# Patient Record
Sex: MALE | Race: WHITE | NOT HISPANIC OR LATINO | Employment: FULL TIME | URBAN - NONMETROPOLITAN AREA
[De-identification: names, ages, dates, MRNs, and addresses within clinical notes are randomized per-mention and may not be internally consistent; named-entity substitution may affect disease eponyms.]

---

## 2018-02-22 ENCOUNTER — OFFICE VISIT (OUTPATIENT)
Dept: INTERNAL MEDICINE | Facility: CLINIC | Age: 38
End: 2018-02-22

## 2018-02-22 VITALS
OXYGEN SATURATION: 99 % | WEIGHT: 228 LBS | SYSTOLIC BLOOD PRESSURE: 122 MMHG | BODY MASS INDEX: 30.22 KG/M2 | TEMPERATURE: 98.4 F | DIASTOLIC BLOOD PRESSURE: 70 MMHG | HEIGHT: 73 IN | HEART RATE: 82 BPM

## 2018-02-22 DIAGNOSIS — Z23 NEED FOR INFLUENZA VACCINATION: ICD-10-CM

## 2018-02-22 DIAGNOSIS — R53.81 MALAISE AND FATIGUE: ICD-10-CM

## 2018-02-22 DIAGNOSIS — L57.0 ACTINIC KERATOSES: ICD-10-CM

## 2018-02-22 DIAGNOSIS — Z00.00 PREVENTATIVE HEALTH CARE: ICD-10-CM

## 2018-02-22 DIAGNOSIS — G43.109 MIGRAINE WITH AURA AND WITHOUT STATUS MIGRAINOSUS, NOT INTRACTABLE: ICD-10-CM

## 2018-02-22 DIAGNOSIS — G47.9 SLEEP DISTURBANCES: ICD-10-CM

## 2018-02-22 DIAGNOSIS — Z76.89 ENCOUNTER TO ESTABLISH CARE: Primary | ICD-10-CM

## 2018-02-22 DIAGNOSIS — R53.83 MALAISE AND FATIGUE: ICD-10-CM

## 2018-02-22 DIAGNOSIS — F41.8 DEPRESSION WITH ANXIETY: ICD-10-CM

## 2018-02-22 LAB
25(OH)D3+25(OH)D2 SERPL-MCNC: 22.4 NG/ML
ALBUMIN SERPL-MCNC: 4.7 G/DL (ref 3.5–5)
ALBUMIN/GLOB SERPL: 1.7 G/DL (ref 1–2)
ALP SERPL-CCNC: 61 U/L (ref 38–126)
ALT SERPL-CCNC: 52 U/L (ref 13–69)
AST SERPL-CCNC: 29 U/L (ref 15–46)
BILIRUB SERPL-MCNC: 0.7 MG/DL (ref 0.2–1.3)
BUN SERPL-MCNC: 11 MG/DL (ref 7–20)
BUN/CREAT SERPL: 13.8 (ref 6.3–21.9)
CALCIUM SERPL-MCNC: 9.8 MG/DL (ref 8.4–10.2)
CHLORIDE SERPL-SCNC: 102 MMOL/L (ref 98–107)
CHOLEST SERPL-MCNC: 164 MG/DL (ref 0–199)
CO2 SERPL-SCNC: 27 MMOL/L (ref 26–30)
CREAT SERPL-MCNC: 0.8 MG/DL (ref 0.6–1.3)
ERYTHROCYTE [DISTWIDTH] IN BLOOD BY AUTOMATED COUNT: 12 % (ref 11.5–14.5)
GFR SERPLBLD CREATININE-BSD FMLA CKD-EPI: 109 ML/MIN/1.73
GFR SERPLBLD CREATININE-BSD FMLA CKD-EPI: 132 ML/MIN/1.73
GLOBULIN SER CALC-MCNC: 2.7 GM/DL
GLUCOSE SERPL-MCNC: 76 MG/DL (ref 74–98)
HCT VFR BLD AUTO: 46.1 % (ref 42–52)
HDLC SERPL-MCNC: 48 MG/DL (ref 40–60)
HGB BLD-MCNC: 16.1 G/DL (ref 14–18)
LDLC SERPL CALC-MCNC: 98 MG/DL (ref 0–99)
MCH RBC QN AUTO: 31 PG (ref 27–31)
MCHC RBC AUTO-ENTMCNC: 34.9 G/DL (ref 30–37)
MCV RBC AUTO: 88.8 FL (ref 80–94)
PLATELET # BLD AUTO: 275 10*3/MM3 (ref 130–400)
POTASSIUM SERPL-SCNC: 4.3 MMOL/L (ref 3.5–5.1)
PROT SERPL-MCNC: 7.4 G/DL (ref 6.3–8.2)
RBC # BLD AUTO: 5.19 10*6/MM3 (ref 4.7–6.1)
SODIUM SERPL-SCNC: 146 MMOL/L (ref 137–145)
TRIGL SERPL-MCNC: 92 MG/DL
TSH SERPL DL<=0.005 MIU/L-ACNC: 2.21 MIU/ML (ref 0.47–4.68)
VLDLC SERPL CALC-MCNC: 18.4 MG/DL
WBC # BLD AUTO: 9.78 10*3/MM3 (ref 4.8–10.8)

## 2018-02-22 PROCEDURE — 96127 BRIEF EMOTIONAL/BEHAV ASSMT: CPT | Performed by: PHYSICIAN ASSISTANT

## 2018-02-22 PROCEDURE — 90686 IIV4 VACC NO PRSV 0.5 ML IM: CPT | Performed by: PHYSICIAN ASSISTANT

## 2018-02-22 PROCEDURE — 99204 OFFICE O/P NEW MOD 45 MIN: CPT | Performed by: PHYSICIAN ASSISTANT

## 2018-02-22 PROCEDURE — 17003 DESTRUCT PREMALG LES 2-14: CPT | Performed by: PHYSICIAN ASSISTANT

## 2018-02-22 PROCEDURE — 90471 IMMUNIZATION ADMIN: CPT | Performed by: PHYSICIAN ASSISTANT

## 2018-02-22 PROCEDURE — 17000 DESTRUCT PREMALG LESION: CPT | Performed by: PHYSICIAN ASSISTANT

## 2018-02-22 RX ORDER — BUSPIRONE HYDROCHLORIDE 10 MG/1
10 TABLET ORAL 3 TIMES DAILY PRN
Qty: 90 TABLET | Refills: 1 | OUTPATIENT
Start: 2018-02-22 | End: 2020-10-26

## 2018-02-22 RX ORDER — FLUOXETINE 10 MG/1
10 CAPSULE ORAL DAILY
Qty: 30 CAPSULE | Refills: 2 | Status: SHIPPED | OUTPATIENT
Start: 2018-02-22 | End: 2018-03-22 | Stop reason: SDUPTHER

## 2018-03-05 ENCOUNTER — TELEPHONE (OUTPATIENT)
Dept: INTERNAL MEDICINE | Facility: CLINIC | Age: 38
End: 2018-03-05

## 2018-03-22 ENCOUNTER — OFFICE VISIT (OUTPATIENT)
Dept: INTERNAL MEDICINE | Facility: CLINIC | Age: 38
End: 2018-03-22

## 2018-03-22 VITALS
OXYGEN SATURATION: 99 % | HEIGHT: 73 IN | SYSTOLIC BLOOD PRESSURE: 120 MMHG | HEART RATE: 71 BPM | DIASTOLIC BLOOD PRESSURE: 74 MMHG | WEIGHT: 226 LBS | BODY MASS INDEX: 29.95 KG/M2 | TEMPERATURE: 98.4 F

## 2018-03-22 DIAGNOSIS — R09.81 NASAL CONGESTION WITH RHINORRHEA: ICD-10-CM

## 2018-03-22 DIAGNOSIS — R29.818 SUSPECTED SLEEP APNEA: ICD-10-CM

## 2018-03-22 DIAGNOSIS — J34.89 NASAL CONGESTION WITH RHINORRHEA: ICD-10-CM

## 2018-03-22 DIAGNOSIS — R06.83 SNORING: ICD-10-CM

## 2018-03-22 DIAGNOSIS — L57.0 ACTINIC KERATOSES: ICD-10-CM

## 2018-03-22 DIAGNOSIS — F41.8 DEPRESSION WITH ANXIETY: Primary | ICD-10-CM

## 2018-03-22 PROCEDURE — 99214 OFFICE O/P EST MOD 30 MIN: CPT | Performed by: PHYSICIAN ASSISTANT

## 2018-03-22 RX ORDER — TRIAMCINOLONE ACETONIDE 55 UG/1
2 SPRAY, METERED NASAL DAILY
Qty: 1 BOTTLE | Refills: 11 | Status: SHIPPED | OUTPATIENT
Start: 2018-03-22 | End: 2019-03-22

## 2018-03-22 RX ORDER — FLUOXETINE HYDROCHLORIDE 20 MG/1
20 CAPSULE ORAL DAILY
Qty: 30 CAPSULE | Refills: 5 | OUTPATIENT
Start: 2018-03-22 | End: 2020-10-26

## 2018-03-22 NOTE — PROGRESS NOTES
Ravi Zabala is a 38 y.o. male.     Subjective   History of Present Illness   1 month ago he was started on Prozac and Buspar. He feels there has been an improvement in depression and anxiety with Prozac 10 mg daily but that there is still room for improvement. He initially needed Buspar 3 times per day but has reduced down to once daily now that Prozac is working. Sleep disturbances and headaches have also improved since decreasing his anxiety.      1 month ago he had several actinic keratoses treated with cryotherapy and all but 2 lesions blistered and fell off as expected. The 2 blisters that did not respond are on the right hand.     Snores heavily and admits to daytime fatigue. Does not feel very rested when waking after sleep. Occasionally wakes himself up from heavy snoring.  He currently has some nasal congestion and postnasal drip which tends to be intermittent throughout the year.       The following portions of the patient's history were reviewed and updated as appropriate: allergies, current medications, past family history, past medical history, past social history, past surgical history and problem list.    Review of Systems    Constitutional: fatigue. Negative for appetite change, chills, fever and unexpected weight change.   HENT: postnasal drip, congestion. Negative for ear pain, hearing loss, nosebleeds, rhinorrhea, sore throat, tinnitus and trouble swallowing.    Eyes: Negative for photophobia, discharge and visual disturbance.   Respiratory: snoring.  Negative for cough, chest tightness, shortness of breath and wheezing.    Cardiovascular: Negative for chest pain, palpitations and leg swelling.   Gastrointestinal: Negative for abdominal distention, abdominal pain, blood in stool, constipation, diarrhea, nausea and vomiting.   Endocrine: Negative for cold intolerance, heat intolerance, polydipsia, polyphagia and polyuria.   Musculoskeletal: Negative for arthralgias, back pain, joint swelling,  "myalgias, neck pain and neck stiffness.   Skin: skin lesions of right hand. Negative for color change, pallor, rash and wound.   Allergic/Immunologic: Negative for environmental allergies, food allergies and immunocompromised state.   Neurological: headache. Negative for dizziness, tremors, seizures, weakness, numbness.  Hematological: Negative for adenopathy. Does not bruise/bleed easily.   Psychiatric/Behavioral: sleep disturbances, dysphoric mood, anxious.  Negative for agitation, behavioral problems, confusion, hallucinations, self-injury and suicidal ideas.       Objective    Physical Exam  Constitutional: Oriented to person, place, and time. Appears well-developed and well-nourished.   HENT: boggy and enlarged nasal turbinates bilaterally.   Head: Normocephalic and atraumatic.   Eyes: EOM are normal. Pupils are equal, round, and reactive to light.   Neck: Normal range of motion. Neck supple.   Cardiovascular: Normal rate, regular rhythm and normal heart sounds.    Pulmonary/Chest: Effort normal and breath sounds normal. No respiratory distress.  Has no wheezes or rales. Exhibits no chest wall tenderness.   Abdominal: Soft. Bowel sounds are normal. Exhibits no distension and no mass. There is no tenderness.   Musculoskeletal: Normal range of motion. Exhibits no tenderness.   Neurological: Alert and oriented to person, place, and time.   Skin: 2 large actinic keratoses of right hand. Skin is warm and dry.   Psychiatric: Has a normal mood and affect. Behavior is normal. Judgment and thought content normal.       /74   Pulse 71   Temp 98.4 °F (36.9 °C)   Ht 185.4 cm (72.99\")   Wt 103 kg (226 lb)   SpO2 99%   BMI 29.82 kg/m²     Nursing note and vitals reviewed.        Assessment/Plan   Ravi was seen today for follow-up, anxiety and depression.    Diagnoses and all orders for this visit:    Depression with anxiety  -     Improved but room for improvement, dose increase: FLUoxetine (PROzac) 20 MG " capsule; Take 1 capsule by mouth Daily.    Actinic keratoses  -     Ambulatory Referral to Dermatology  2 lesions failed cryotherapy, several other lesions responded to cryotherapy.     Snoring  Suspected sleep apnea  -     Ambulatory Referral to Sleep Medicine    Nasal congestion with rhinorrhea, boggy nasal turbinates  -     Begin: Triamcinolone Acetonide (NASACORT AQ) 55 MCG/ACT nasal inhaler; 2 sprays into each nostril Daily.      F/u 6 months or sooner if needed.

## 2020-10-26 PROCEDURE — U0004 COV-19 TEST NON-CDC HGH THRU: HCPCS | Performed by: NURSE PRACTITIONER

## 2022-01-12 PROCEDURE — U0004 COV-19 TEST NON-CDC HGH THRU: HCPCS | Performed by: FAMILY MEDICINE

## 2022-01-23 PROCEDURE — U0004 COV-19 TEST NON-CDC HGH THRU: HCPCS | Performed by: PHYSICIAN ASSISTANT

## 2022-03-24 ENCOUNTER — TELEPHONE (OUTPATIENT)
Dept: PEDIATRICS | Facility: OTHER | Age: 42
End: 2022-03-24

## 2022-03-24 NOTE — TELEPHONE ENCOUNTER
Caller: Allyson Zabala    Relationship to patient: Emergency Contact    Best call back number: [phone]    Chief complaint: HIGH BLOOD COUNT     Type of visit: NEW PATIENT     Requested date: ANY DAY AFTER 3:00    If rescheduling, when is the original appointment:     Additional notes: PATIENT WOULD LIKE TO REQUEST A NEW PATIENT APPOINTMENT WITH DR. MYERS HOWEVER SHE IS ONLY SEEING NEW PATIENTS IN THE MORNING AND THE PATIENT IS UNABLE TO TAKE OFF WORK FOR AN APPOINTMENT. PATIENT WOULD LIKE TO ASK IF DR. MYERS WOULD BE WILLING TO SEE HIM FOR A NEW PATIENT APPOINTMENT 3:00 OR LATER ANY DAY OF THE WEEK.

## 2022-06-02 PROCEDURE — U0004 COV-19 TEST NON-CDC HGH THRU: HCPCS | Performed by: PHYSICIAN ASSISTANT

## 2023-07-26 ENCOUNTER — OFFICE VISIT (OUTPATIENT)
Dept: FAMILY MEDICINE CLINIC | Facility: CLINIC | Age: 43
End: 2023-07-26
Payer: COMMERCIAL

## 2023-07-26 VITALS
BODY MASS INDEX: 31.73 KG/M2 | OXYGEN SATURATION: 98 % | TEMPERATURE: 98 F | WEIGHT: 239.4 LBS | HEART RATE: 65 BPM | DIASTOLIC BLOOD PRESSURE: 70 MMHG | SYSTOLIC BLOOD PRESSURE: 100 MMHG | HEIGHT: 73 IN

## 2023-07-26 DIAGNOSIS — F43.0 ACUTE REACTION TO SITUATIONAL STRESS: Primary | ICD-10-CM

## 2023-07-26 PROCEDURE — 99213 OFFICE O/P EST LOW 20 MIN: CPT | Performed by: NURSE PRACTITIONER

## 2023-07-26 RX ORDER — BUSPIRONE HYDROCHLORIDE 5 MG/1
TABLET ORAL
Qty: 90 TABLET | Refills: 5 | Status: SHIPPED | OUTPATIENT
Start: 2023-07-26

## 2023-08-22 DIAGNOSIS — F43.0 ACUTE REACTION TO SITUATIONAL STRESS: ICD-10-CM

## 2023-08-23 DIAGNOSIS — F43.0 ACUTE REACTION TO SITUATIONAL STRESS: ICD-10-CM

## 2023-08-23 RX ORDER — BUSPIRONE HYDROCHLORIDE 5 MG/1
TABLET ORAL
Qty: 90 TABLET | Refills: 5 | OUTPATIENT
Start: 2023-08-23

## 2023-09-12 DIAGNOSIS — F43.0 ACUTE REACTION TO SITUATIONAL STRESS: ICD-10-CM

## 2023-09-12 RX ORDER — BUSPIRONE HYDROCHLORIDE 10 MG/1
TABLET ORAL
Qty: 90 TABLET | Refills: 5 | Status: SHIPPED | OUTPATIENT
Start: 2023-09-12

## 2023-09-28 ENCOUNTER — TELEPHONE (OUTPATIENT)
Dept: FAMILY MEDICINE CLINIC | Facility: CLINIC | Age: 43
End: 2023-09-28

## 2023-09-28 NOTE — TELEPHONE ENCOUNTER
Caller: Ravi Zabala    Relationship to patient: Self    Best call back number: [phone]     PATIENT IS CALLING TO TALK WITH REJI REGARDING INCREASE HIS BUSPIRONE TO FOUR OR FIVE TIMES A DAY.  HE IS NOT SLEEPING.

## 2023-10-02 ENCOUNTER — OFFICE VISIT (OUTPATIENT)
Dept: FAMILY MEDICINE CLINIC | Facility: CLINIC | Age: 43
End: 2023-10-02
Payer: COMMERCIAL

## 2023-10-02 VITALS
HEIGHT: 73 IN | DIASTOLIC BLOOD PRESSURE: 90 MMHG | TEMPERATURE: 97.1 F | BODY MASS INDEX: 31.41 KG/M2 | OXYGEN SATURATION: 97 % | SYSTOLIC BLOOD PRESSURE: 130 MMHG | HEART RATE: 59 BPM | WEIGHT: 237.02 LBS

## 2023-10-02 DIAGNOSIS — F41.8 SITUATIONAL ANXIETY: ICD-10-CM

## 2023-10-02 DIAGNOSIS — F32.0 CURRENT MILD EPISODE OF MAJOR DEPRESSIVE DISORDER WITHOUT PRIOR EPISODE: ICD-10-CM

## 2023-10-02 DIAGNOSIS — F43.0 ACUTE REACTION TO SITUATIONAL STRESS: Primary | ICD-10-CM

## 2023-10-02 PROCEDURE — 99214 OFFICE O/P EST MOD 30 MIN: CPT | Performed by: NURSE PRACTITIONER

## 2023-10-02 RX ORDER — BUSPIRONE HYDROCHLORIDE 15 MG/1
TABLET ORAL
Qty: 90 TABLET | Refills: 3 | Status: SHIPPED | OUTPATIENT
Start: 2023-10-02

## 2023-10-02 RX ORDER — ESCITALOPRAM OXALATE 5 MG/1
5 TABLET ORAL DAILY
Qty: 30 TABLET | Refills: 1 | Status: SHIPPED | OUTPATIENT
Start: 2023-10-02

## 2023-10-02 RX ORDER — HYDROXYZINE HYDROCHLORIDE 10 MG/1
TABLET, FILM COATED ORAL
Qty: 60 TABLET | Refills: 2 | Status: SHIPPED | OUTPATIENT
Start: 2023-10-02

## 2023-10-02 NOTE — TELEPHONE ENCOUNTER
Needs appt but also buspar should not really be dose more than 3 times a day, if needed it more than that we should go up on dose

## 2023-10-02 NOTE — PROGRESS NOTES
"      Subjective     Chief Complaint:    Chief Complaint   Patient presents with    Anxiety       History of Present Illness:   Notes worsening situanational anzxiety, is taking buspar, notes chest pain, trouble sleeping, feels like he is in constant fight or flight. Drinks several drinks only on weekends.   Some mild depression, no SI/HI  Some feelings of guilt, failure, decreased appetite   Has not gone to counseling       Review of Systems  Gen- No fevers, chills  CV- No chest pain, palpitations  Resp- No cough, dyspnea  GI- No N/V/D, abd pain  Neuro-No dizziness, headaches      I have reviewed and/or updated the patient's past medical, surgical, family, social history and problem list as appropriate.     Medications:    Current Outpatient Medications:     busPIRone (BUSPAR) 15 MG tablet, 1 po TID prn anxiety, Disp: 90 tablet, Rfl: 3    cetirizine (zyrTEC) 10 MG tablet, Take 1 tablet by mouth Daily., Disp: , Rfl:     escitalopram (Lexapro) 5 MG tablet, Take 1 tablet by mouth Daily., Disp: 30 tablet, Rfl: 1    hydrOXYzine (ATARAX) 10 MG tablet, 1-3 tablets po bid breakthrough anxiety, Disp: 60 tablet, Rfl: 2    Allergies:  No Known Allergies    Objective     Vital Signs:   Vitals:    10/02/23 1101   BP: 130/90   Pulse: 59   Temp: 97.1 °F (36.2 °C)   SpO2: 97%   Weight: 108 kg (237 lb 0.3 oz)   Height: 185.4 cm (72.99\")     Body mass index is 31.28 kg/m².    Physical Exam:    Physical Exam  Vitals and nursing note reviewed.   Constitutional:       Appearance: He is well-developed.   HENT:      Head: Normocephalic and atraumatic.   Eyes:      Pupils: Pupils are equal, round, and reactive to light.   Cardiovascular:      Rate and Rhythm: Normal rate and regular rhythm.      Heart sounds: Normal heart sounds.   Pulmonary:      Effort: Pulmonary effort is normal.      Breath sounds: Normal breath sounds.   Abdominal:      General: Bowel sounds are normal. There is no distension.      Palpations: Abdomen is soft.      " Tenderness: There is no abdominal tenderness.   Musculoskeletal:      Cervical back: Neck supple.   Skin:     General: Skin is warm and dry.   Neurological:      General: No focal deficit present.      Mental Status: He is alert and oriented to person, place, and time.   Psychiatric:         Mood and Affect: Mood normal.         Behavior: Behavior normal.       Assessment / Plan     Assessment/Plan:   Problem List Items Addressed This Visit    None  Visit Diagnoses       Acute reaction to situational stress    -  Primary    Relevant Medications    busPIRone (BUSPAR) 15 MG tablet    hydrOXYzine (ATARAX) 10 MG tablet    escitalopram (Lexapro) 5 MG tablet    Current mild episode of major depressive disorder without prior episode        Relevant Medications    busPIRone (BUSPAR) 15 MG tablet    hydrOXYzine (ATARAX) 10 MG tablet    escitalopram (Lexapro) 5 MG tablet    Situational anxiety        Relevant Medications    hydrOXYzine (ATARAX) 10 MG tablet          -- add lexapro and hydroxyzine, increase buspar, strongly encouraged counseling     Discussed plan of care in detail with pt today; pt verb understanding and agrees.    Follow up:  4 weeks    Electronically signed by JOSE LUIS Graham   10/02/2023 11:15 EDT      Please note that portions of this note were completed with a voice recognition program.

## 2023-11-03 ENCOUNTER — OFFICE VISIT (OUTPATIENT)
Dept: FAMILY MEDICINE CLINIC | Facility: CLINIC | Age: 43
End: 2023-11-03
Payer: COMMERCIAL

## 2023-11-03 VITALS
SYSTOLIC BLOOD PRESSURE: 124 MMHG | HEIGHT: 73 IN | DIASTOLIC BLOOD PRESSURE: 88 MMHG | RESPIRATION RATE: 16 BRPM | BODY MASS INDEX: 30.48 KG/M2 | TEMPERATURE: 98 F | HEART RATE: 92 BPM | WEIGHT: 230 LBS | OXYGEN SATURATION: 98 %

## 2023-11-03 DIAGNOSIS — F43.0 ACUTE REACTION TO SITUATIONAL STRESS: ICD-10-CM

## 2023-11-03 DIAGNOSIS — F32.0 CURRENT MILD EPISODE OF MAJOR DEPRESSIVE DISORDER WITHOUT PRIOR EPISODE: Primary | ICD-10-CM

## 2023-11-03 DIAGNOSIS — F41.8 SITUATIONAL ANXIETY: ICD-10-CM

## 2023-11-03 PROCEDURE — 99213 OFFICE O/P EST LOW 20 MIN: CPT | Performed by: NURSE PRACTITIONER

## 2023-11-03 RX ORDER — ESCITALOPRAM OXALATE 5 MG/1
5 TABLET ORAL DAILY
Qty: 30 TABLET | Refills: 2 | Status: SHIPPED | OUTPATIENT
Start: 2023-11-03

## 2023-11-03 RX ORDER — HYDROXYZINE HYDROCHLORIDE 10 MG/1
TABLET, FILM COATED ORAL
Qty: 60 TABLET | Refills: 2 | Status: SHIPPED | OUTPATIENT
Start: 2023-11-03

## 2023-11-03 NOTE — PROGRESS NOTES
"      Subjective     Chief Complaint:    Chief Complaint   Patient presents with    Med Refill    Follow-up    Anxiety       History of Present Illness:     Ravi Zabala is a 43 year old male patient presents to clinic today for follow up for anxiety.   Tolerating medications well  Escitalopram 5 mg daily   Buspar 15 mg TID  Atarax QD nightly   Still experiencing intermittent chest pain, trouble sleeping, feels like he is in constant fight or flight.   Employer is supportive,  anxiety about missing work/mounting work  Wife has moved out of home  Drinks 6-8 beers on weekends.   Some mild depression, no SI/HI  Some feelings of guilt, failure, decreased appetite   Has not gone to counseling- upcoming appt   Goes to the gym 3-4 times per week    Saw derm. Scheduled for biopsy of lesions on right hand. Will update with pathology report.       Review of Systems  Gen- No fevers, chills  CV- No chest pain, palpitations  Resp- No cough, dyspnea  GI- No N/V/D, abd pain  Neuro-No dizziness, headaches      I have reviewed and/or updated the patient's past medical, surgical, family, social history and problem list as appropriate.     Medications:    Current Outpatient Medications:     busPIRone (BUSPAR) 15 MG tablet, 1 po TID prn anxiety, Disp: 90 tablet, Rfl: 3    cetirizine (zyrTEC) 10 MG tablet, Take 1 tablet by mouth Daily., Disp: , Rfl:     escitalopram (Lexapro) 5 MG tablet, Take 1 tablet by mouth Daily., Disp: 30 tablet, Rfl: 2    hydrOXYzine (ATARAX) 10 MG tablet, 1-3 tablets po bid breakthrough anxiety, Disp: 60 tablet, Rfl: 2    Allergies:  No Known Allergies    Objective     Vital Signs:   Vitals:    11/03/23 1522   BP: 124/88   Pulse: 92   Resp: 16   Temp: 98 °F (36.7 °C)   SpO2: 98%   Weight: 104 kg (230 lb)   Height: 185.4 cm (73\")     Body mass index is 30.34 kg/m².    Physical Exam:    Physical Exam  Constitutional:       Appearance: Normal appearance.   Cardiovascular:      Rate and Rhythm: Normal rate and " regular rhythm.      Pulses: Normal pulses.      Heart sounds: Normal heart sounds.   Abdominal:      General: Abdomen is flat.   Skin:     General: Skin is warm and dry.   Neurological:      General: No focal deficit present.      Mental Status: He is alert and oriented to person, place, and time.   Psychiatric:         Mood and Affect: Mood normal.         Behavior: Behavior normal.       Assessment / Plan     Assessment/Plan:   Problem List Items Addressed This Visit       Current mild episode of major depressive disorder without prior episode - Primary    Relevant Medications    busPIRone (BUSPAR) 15 MG tablet    hydrOXYzine (ATARAX) 10 MG tablet    escitalopram (Lexapro) 5 MG tablet     Other Visit Diagnoses       Acute reaction to situational stress        Relevant Medications    hydrOXYzine (ATARAX) 10 MG tablet    escitalopram (Lexapro) 5 MG tablet    Situational anxiety        Relevant Medications    hydrOXYzine (ATARAX) 10 MG tablet          -- continue current regimen  --encourage patient to keep counseling appt     Discussed plan of care in detail with pt today; pt verb understanding and agrees.    Tawana BAEZ, personally performed the services described in this documentation, as scribed by JOSE LUIS Cortez student,  in my presence, and is both accurate and complete      Follow up:  Return in about 2 months (around 1/3/2024).      Tawana BAEZ, personally performed the services described in this documentation, as scribed by JOSE LUSI Cortez student, in my presence, and is both accurate and complete      Electronically signed by JOSE LUIS Graham   11/03/2023 15:25 EDT      Please note that portions of this note were completed with a voice recognition program.

## 2024-02-19 DIAGNOSIS — F43.0 ACUTE REACTION TO SITUATIONAL STRESS: ICD-10-CM

## 2024-02-19 RX ORDER — BUSPIRONE HYDROCHLORIDE 15 MG/1
TABLET ORAL
Qty: 90 TABLET | Refills: 0 | Status: SHIPPED | OUTPATIENT
Start: 2024-02-19